# Patient Record
Sex: MALE | Race: BLACK OR AFRICAN AMERICAN | URBAN - METROPOLITAN AREA
[De-identification: names, ages, dates, MRNs, and addresses within clinical notes are randomized per-mention and may not be internally consistent; named-entity substitution may affect disease eponyms.]

---

## 2017-07-25 ENCOUNTER — HOSPITAL ENCOUNTER (OUTPATIENT)
Dept: GENERAL RADIOLOGY | Facility: HOSPITAL | Age: 27
End: 2017-07-25
Attending: PREVENTIVE MEDICINE
Payer: OTHER MISCELLANEOUS

## 2017-07-25 ENCOUNTER — HOSPITAL ENCOUNTER (OUTPATIENT)
Dept: GENERAL RADIOLOGY | Facility: HOSPITAL | Age: 27
Discharge: HOME | End: 2017-07-25
Attending: PREVENTIVE MEDICINE
Payer: OTHER MISCELLANEOUS

## 2017-07-25 ENCOUNTER — OFFICE VISIT (OUTPATIENT)
Dept: OTHER | Facility: HOSPITAL | Age: 27
End: 2017-07-25
Attending: PREVENTIVE MEDICINE
Payer: OTHER MISCELLANEOUS

## 2017-07-25 DIAGNOSIS — S86.911A KNEE STRAIN, RIGHT, INITIAL ENCOUNTER: ICD-10-CM

## 2017-07-25 DIAGNOSIS — S86.912A KNEE STRAIN, LEFT, INITIAL ENCOUNTER: Primary | ICD-10-CM

## 2017-07-25 PROCEDURE — 73562 X-RAY EXAM OF KNEE 3: CPT | Performed by: PREVENTIVE MEDICINE

## 2017-07-25 RX ORDER — ACETAMINOPHEN AND CODEINE PHOSPHATE 300; 30 MG/1; MG/1
TABLET ORAL
Qty: 12 TABLET | Refills: 0 | Status: SHIPPED | OUTPATIENT
Start: 2017-07-25 | End: 2018-04-04

## 2017-07-25 NOTE — PATIENT INSTRUCTIONS
Ice to knee frequently    Ace wrap during day on left knee, remove for sleep    Tyl # 3 # 12  1-2 every 6 hrs as needed for pain    Off work until follow up 7/27/17      Acetaminophen; Codeine tablets   What is this medicine?   ACETAMINOPHEN; CODEINE (a set · certain antibiotics like erythromycin and clarithromycin  · certain medicines for anxiety or sleep  · certain medicines for bladder problems like oxybutynin, tolterodine  · certain medicines for depression like amitriptyline, fluoxetine, sertraline  · ce Store at room temperature between 15 and 30 degrees C (59 and 86 degrees F). What should I tell my health care provider before I take this medicine?   They need to know if you have any of these conditions:  · brain tumor  · Crohn's disease, inflammatory lainey There are different types of narcotic medicines (opiates). If you take more than one type at the same time or if you are taking another medicine that also causes drowsiness, you may have more side effects.  Give your health care provider a list of all medic NOTE:This sheet is a summary. It may not cover all possible information. If you have questions about this medicine, talk to your doctor, pharmacist, or health care provider.  Copyright© 2016 [employer]

## 2017-07-27 ENCOUNTER — APPOINTMENT (OUTPATIENT)
Dept: OTHER | Facility: HOSPITAL | Age: 27
End: 2017-07-27
Attending: PREVENTIVE MEDICINE
Payer: OTHER MISCELLANEOUS

## 2017-08-03 ENCOUNTER — OFFICE VISIT (OUTPATIENT)
Dept: OTHER | Facility: HOSPITAL | Age: 27
End: 2017-08-03
Attending: PREVENTIVE MEDICINE
Payer: OTHER MISCELLANEOUS

## 2017-08-03 DIAGNOSIS — S86.912D KNEE STRAIN, LEFT, SUBSEQUENT ENCOUNTER: Primary | ICD-10-CM

## 2017-08-03 DIAGNOSIS — M25.462 EFFUSION OF LEFT KNEE: ICD-10-CM

## 2017-08-03 RX ORDER — IBUPROFEN 600 MG/1
TABLET ORAL
Qty: 20 TABLET | Refills: 0 | Status: SHIPPED | OUTPATIENT
Start: 2017-08-03 | End: 2018-04-04

## 2017-08-08 ENCOUNTER — OFFICE VISIT (OUTPATIENT)
Dept: OTHER | Facility: HOSPITAL | Age: 27
End: 2017-08-08
Attending: PREVENTIVE MEDICINE
Payer: OTHER MISCELLANEOUS

## 2017-08-08 DIAGNOSIS — M23.92 ACUTE INTERNAL DERANGEMENT OF LEFT KNEE: Primary | ICD-10-CM

## 2017-08-08 RX ORDER — HYDROCODONE BITARTRATE AND ACETAMINOPHEN 5; 325 MG/1; MG/1
TABLET ORAL
Qty: 20 TABLET | Refills: 0 | Status: SHIPPED | OUTPATIENT
Start: 2017-08-08 | End: 2018-04-04

## 2017-08-09 PROBLEM — S83.512A RUPTURE OF ANTERIOR CRUCIATE LIGAMENT OF LEFT KNEE, INITIAL ENCOUNTER: Status: ACTIVE | Noted: 2017-08-09

## 2017-08-09 PROBLEM — S82.142A TIBIAL PLATEAU FRACTURE, LEFT, CLOSED, INITIAL ENCOUNTER: Status: ACTIVE | Noted: 2017-08-09

## 2017-08-09 PROBLEM — S83.242A TEAR OF MEDIAL MENISCUS OF LEFT KNEE, CURRENT, UNSPECIFIED TEAR TYPE, INITIAL ENCOUNTER: Status: ACTIVE | Noted: 2017-08-09

## 2017-08-09 PROBLEM — S80.02XA CONTUSION OF LEFT KNEE, INITIAL ENCOUNTER: Status: ACTIVE | Noted: 2017-08-09

## 2017-08-09 PROBLEM — M25.462 KNEE EFFUSION, LEFT: Status: ACTIVE | Noted: 2017-08-09

## 2018-04-11 PROBLEM — Z98.890 S/P ACL RECONSTRUCTION: Status: ACTIVE | Noted: 2018-04-11

## (undated) NOTE — MR AVS SNAPSHOT
After Visit Summary   7/25/2017    Donavon Zuñiga    MRN: [id number]           Visit Information     Date & Time  7/25/2017  2:20 PM Provider  [address] [facility]  Phone  [phone]      Follow Side effects that usually do not require medical attention (report to your doctor or health care professional if they continue or are bothersome):  · constipation  · dry mouth  · nausea, vomiting  · tiredness  What may interact with this medicine?   This me medicine with anyone. Selling or giving away this medicine is dangerous and against the law. This medicine may cause accidental overdose and death if it taken by other adults, children, or pets.  Mix any unused medicine with a substance like cat litter or If your doctor wants you to stop the medicine, the dose will be slowly lowered over time to avoid any side effects. There are different types of narcotic medicines (opiates).  If you take more than one type at the same time or if you are taking another med breathing, noisy breathing, confusion, or unusual sleepiness, stop giving this medicine and get medical help right away. Date Last Reviewed:   NOTE:This sheet is a summary. It may not cover all possible information.  If you have questions about this medici your Zip Code and Date of Birth to complete the sign-up process. If you do not sign up before the expiration date, you must request a new code.     Your unique [employer] Access Code: [email or web address]  Expires: 9/23/2017  4:18 PM    If you have questions, you can c